# Patient Record
Sex: MALE | Race: WHITE | NOT HISPANIC OR LATINO | URBAN - METROPOLITAN AREA
[De-identification: names, ages, dates, MRNs, and addresses within clinical notes are randomized per-mention and may not be internally consistent; named-entity substitution may affect disease eponyms.]

---

## 2024-09-12 ENCOUNTER — EMERGENCY (EMERGENCY)
Facility: HOSPITAL | Age: 39
LOS: 0 days | Discharge: HOME | End: 2024-09-12
Attending: EMERGENCY MEDICINE
Payer: COMMERCIAL

## 2024-09-12 VITALS
DIASTOLIC BLOOD PRESSURE: 76 MMHG | OXYGEN SATURATION: 96 % | SYSTOLIC BLOOD PRESSURE: 132 MMHG | TEMPERATURE: 98 F | WEIGHT: 179.9 LBS | HEART RATE: 90 BPM | RESPIRATION RATE: 18 BRPM

## 2024-09-12 DIAGNOSIS — R07.89 OTHER CHEST PAIN: ICD-10-CM

## 2024-09-12 PROCEDURE — 93010 ELECTROCARDIOGRAM REPORT: CPT

## 2024-09-12 PROCEDURE — 99284 EMERGENCY DEPT VISIT MOD MDM: CPT

## 2024-09-12 PROCEDURE — 99283 EMERGENCY DEPT VISIT LOW MDM: CPT | Mod: 25

## 2024-09-12 PROCEDURE — 93005 ELECTROCARDIOGRAM TRACING: CPT

## 2024-09-12 NOTE — ED PROVIDER NOTE - NSFOLLOWUPINSTRUCTIONS_ED_ALL_ED_FT
Our Emergency Department Referral Coordinators will be reaching out to you in the next 24-48 hours from 9:00am to 5:00pm with a follow up appointment. Please expect a phone call from the hospital in that time frame. If you do not receive a call or if you have any questions or concerns, you can reach them at   [phone]  ------------------------------------------------  Chest Pain    AMBULATORY CARE:    Chest pain can be caused by a range of conditions, from not serious to life-threatening. It is important to follow up with your healthcare provider to find the cause of your chest pain.    Common symptoms you may have with chest pain:    Fever or sweating    Nausea or vomiting    Shortness of breath    Discomfort or pressure that spreads from your chest to your back, jaw, or arm    A racing or slow heartbeat    Feeling weak, tired, or faint  Call your local emergency number (911 in the [place]) or have someone call if:    You have any of the following signs of a heart attack:  Squeezing, pressure, or pain in your chest    You may also have any of the following:  Discomfort or pain in your back, neck, jaw, stomach, or arm    Shortness of breath    Nausea or vomiting    Lightheadedness or a sudden cold sweat    Seek care immediately if:    You have chest discomfort that gets worse, even with medicine.    You cough or vomit blood.    Your bowel movements are black or bloody.    You cannot stop vomiting, or it hurts to swallow.  Call your doctor if:    You have questions or concerns about your condition or care.    Treatment for chest pain may include medicine to treat your symptoms while your healthcare provider finds the cause of your chest pain.    Medicines may be given to treat the cause of your chest pain. Examples include pain medicine, anxiety medicine, or medicines to increase blood flow to your heart.    Do not take certain medicines without asking your healthcare provider first. These include NSAIDs, herbal or vitamin supplements, and hormones, such as estrogen or progestin.    One or more stents may need to be placed in your heart if pain was caused by blockage. A stent is a wire mesh tube that helps hold your artery open.  Healthy living tips: If the cause of your chest pain is known, your healthcare provider will give you specific guidelines to follow. The following are general healthy guidelines:    Do not smoke. Nicotine and other chemicals in cigarettes and cigars can cause lung and heart damage. Ask your healthcare provider for information if you currently smoke and need help to quit. E-cigarettes or smokeless tobacco still contain nicotine. Talk to your healthcare provider before you use these products.    Choose a variety of healthy foods as often as possible. Include fresh, frozen, or canned fruits and vegetables. Also include low-fat dairy products, fish, chicken (without skin), and lean meats. Your healthcare provider or a dietitian can help you create meal plans. You may need to avoid certain foods or drinks if your pain is caused by a digestion problem.  Healthy Foods      Lower your sodium (salt) intake. Limit foods that are high in sodium, such as canned foods, salty snacks, and cold cuts. If you add salt when you cook food, do not add more at the table. Choose low-sodium canned foods as much as possible.        Drink plenty of water every day. Water helps your body to control your temperature and blood pressure. Ask your healthcare provider how much water you should drink every day.    Ask about activity. Your healthcare provider will tell you which activities to limit or avoid. Ask when you can drive, return to work, and have sex. Ask about the best exercise plan for you.    Maintain a healthy weight. Ask your healthcare provider what a healthy weight is for you. Ask him or her to help you create a safe weight loss plan if you are overweight.    Ask about vaccines you may need. Your healthcare provider can tell you which vaccines you need, and when to get them. The following vaccines help prevent diseases that can become serious for a person with a heart condition:  The influenza (flu) vaccine is given each year. Get a flu vaccine as soon as recommended, usually in September or October.    The pneumonia vaccine is usually given every 5 years. Your healthcare provider may recommend the pneumonia vaccine if you are 65 or older.    COVID-19 vaccines are given to adults as a shot. At least 1 dose of an updated vaccine is recommended for all adults. COVID-19 vaccines are updated throughout the year. Adults 65 or older need a second dose of updated vaccine at least 4 months after the first dose. Your healthcare provider can help you schedule all needed doses as updated vaccines become available.  Prevent Heart Disease   Follow up with your doctor within 72 hours, or as directed: You may need to return for more tests to find the cause of your chest pain. You may be referred to a specialist, such as a cardiologist or gastroenterologist. Write down your questions so you remember to ask them during your visits.

## 2024-09-12 NOTE — ED PROVIDER NOTE - PHYSICAL EXAMINATION
VITAL SIGNS: I have reviewed nursing notes and confirm.  CONSTITUTIONAL: Well-developed; well-nourished; in no acute distress.  SKIN: Skin exam is warm and dry, no acute rash.  EYES: PERRL, conjunctiva and sclera clear.  ENT: mmm  CARD: S1, S2 normal; no murmurs, gallops, or rubs. Regular rate and rhythm.  RESP: Normal respiratory effort, no tachypnea or distress. Lungs CTAB, no wheezes, rales or rhonchi.  chest wall non-tender, no skin changes  ABD: soft, NT/ND.  EXT: Normal ROM. No clubbing, cyanosis or edema.  NEURO: Alert, oriented. Grossly unremarkable. No focal deficits.  PSYCH: Cooperative, appropriate.

## 2024-09-12 NOTE — ED ADULT NURSE NOTE - OBJECTIVE STATEMENT
Pt c/o  chest pain since friday.. Was seen by ER in [place] received bloodwork but no cardiac imaging.

## 2024-09-12 NOTE — ED PROVIDER NOTE - CLINICAL SUMMARY MEDICAL DECISION MAKING FREE TEXT BOX
Independent interpretation of the EKG performed by MD Hodges    DX: CHEST PAIN. Patient's labs/cardiac enzymes this week WNL, and today EKG non-diagnostic and without signs of active ischemia so doubt NSTEMI, HEART score 0, atypical presentation for PE, dissection, pneumothorax (not visualized on chest xr), pericarditis, tamponade, pneumonia (no infectious symptoms, clear chest xr), myocarditis (no recent illness, neg trop). Exam without evidence of volume overload so doubt heart failure.     These elements were d/w the pt. It was explained that initial testing was unremarkable, it does not appear that they are having a heart attack, symptoms are less likely due to cardiac etiology. It was explained that the risk of 30-day major adverse cardiac event upon discharge is low and they do not need admission at this time. Was explained that the source of their symptoms is unclear and that the patient should still follow up with their primary physician or cardiology for further evaluation and management.    The patient was given detailed return precautions and advised to return to the emergency department if any new symptoms developed, symptoms worsened or for any concerns. The patient was offered the opportunity to ask questions and verbalized that they understand the diagnosis and discharge instructions.

## 2024-09-12 NOTE — ED PROVIDER NOTE - OBTAINED AND REVIEWED OLD RECORDS MULTI-SELECT OPTIONS
Prior Outpatient Labs/Prior Outpatient Radiology Inpatient Records/Prior Outpatient Labs/Prior Outpatient Radiology

## 2024-09-12 NOTE — ED PROVIDER NOTE - PATIENT PORTAL LINK FT
You can access the FollowMyHealth Patient Portal offered by [facility] by registering at the following website: [email or web address]. By joining [facility]’s FollowMyHealth portal, you will also be able to view your health information using other applications (apps) compatible with our system.

## 2024-09-12 NOTE — ED ADULT NURSE NOTE - CHIEF COMPLAINT QUOTE
Chest pain since friday, denies pmhx. Was seen by ER in [place] received bloodwork but no cardiac imaging.

## 2024-09-12 NOTE — ED PROVIDER NOTE - OBJECTIVE STATEMENT
39M no PMH presents with CP x4 days. Pt was seen at [facility] initially with negative ekg, cxr and troponins and was discharged home with outpatient f/u. Reports constant R sided chest wall pain without modifying or relieving factors. Denies trauma, fever/chills, SOB, palpitations, abdominal pain, n/v/d, urinary sx, extremity swelling/numbness/weakness/paresthesias, tobacco or substance use. Does not have PMD or cardiologist, no imaging in the past.

## 2024-09-12 NOTE — ED PROVIDER NOTE - ATTENDING CONTRIBUTION TO CARE
I have reviewed and agree with the mid-level note, except as documented in my note below.    39-year-old male denies significant PMH/PSH, non-smoker, denies daily alcohol use, denies FH CAD, presents with right-sided chest discomfort for the past several days, persistent, denies radiation or modifying factors, denies exertional chest pain, fever, nausea, vomiting, diaphoresis, hemoptysis, SOB, orthopnea, PND, LE pain or swelling, recent immobilization or travel or other associated complaints at present. The patient was seen in [facility] and has his results available for review, EKG, cardiac enzymes, labs, x-ray nondiagnostic, was discharged to follow-up as outpatient. No old chart available for review. I have reviewed and agree with the initial nursing note, except as documented in my note.    VSS, awake, alert, non-toxic appearing, oropharynx clear, mmm, no skin rash or lesions, chest CTAB, non-labored breathing, no w/r/r, +S1/S2, RRR, no m/r/g, abdomen soft, NT, ND, +BS, no organomegaly or pulsatile masses, no peripheral edema or deformities, equal pulses upper and lower extremities, alert, clear speech and steady gait.

## 2024-09-12 NOTE — ED ADULT TRIAGE NOTE - CHIEF COMPLAINT QUOTE
Chest pain since friday, denies pmhx. Was seen by ER in [place] received bloodwork but no cardiac imaging.

## 2024-09-12 NOTE — ED ADULT NURSE NOTE - NSFALLUNIVINTERV_ED_ALL_ED
Assistance with ambulation/Communicate risk of Fall with Harm to all staff, patient, and family/Monitor gait and stability/Monitor for mental status changes and reorient to person, place, and time, as needed/Reinforce activity limits and safety measures with patient and family/Use of alarms - bed, stretcher, chair and/or video monitoring/Bed/Stretcher in lowest position, wheels locked, appropriate side rails in place/Call bell, personal items and telephone in reach/Instruct patient to call for assistance before getting out of bed/chair/stretcher/Non-slip footwear applied when patient is off stretcher/[place] to call system/Physically safe environment - no spills, clutter or unnecessary equipment/Purposeful proactive rounding/Room/bathroom lighting operational, light cord in reach

## 2024-09-12 NOTE — ED PROVIDER NOTE - DIFFERENTIAL DIAGNOSIS
Differential Diagnosis see mdm    Independent interpretation of the EKG performed by MD Hodges see mdm

## 2024-09-17 NOTE — CHART NOTE - NSCHARTNOTEFT_GEN_A_CORE
[facility] MRN : [id number] - patient would like pcp follow up mornings please / Emailed [facility] 9/12 - TANA/ [facility] eid two VMs 9/17 - DK

## 2024-09-26 ENCOUNTER — APPOINTMENT (OUTPATIENT)
Dept: CARDIOLOGY | Facility: CLINIC | Age: 39
End: 2024-09-26
Payer: COMMERCIAL

## 2024-09-26 ENCOUNTER — RESULT CHARGE (OUTPATIENT)
Age: 39
End: 2024-09-26

## 2024-09-26 VITALS
HEIGHT: 68 IN | BODY MASS INDEX: 28.04 KG/M2 | SYSTOLIC BLOOD PRESSURE: 124 MMHG | WEIGHT: 185 LBS | HEART RATE: 68 BPM | DIASTOLIC BLOOD PRESSURE: 82 MMHG

## 2024-09-26 DIAGNOSIS — Z13.220 ENCOUNTER FOR SCREENING FOR LIPOID DISORDERS: ICD-10-CM

## 2024-09-26 DIAGNOSIS — Z78.9 OTHER SPECIFIED HEALTH STATUS: ICD-10-CM

## 2024-09-26 DIAGNOSIS — Z13.6 ENCOUNTER FOR SCREENING FOR LIPOID DISORDERS: ICD-10-CM

## 2024-09-26 DIAGNOSIS — R07.89 OTHER CHEST PAIN: ICD-10-CM

## 2024-09-26 PROBLEM — Z00.00 ENCOUNTER FOR PREVENTIVE HEALTH EXAMINATION: Status: ACTIVE | Noted: 2024-09-26

## 2024-09-26 PROCEDURE — 93000 ELECTROCARDIOGRAM COMPLETE: CPT

## 2024-09-26 PROCEDURE — 99203 OFFICE O/P NEW LOW 30 MIN: CPT | Mod: 25

## 2024-09-26 NOTE — REASON FOR VISIT
[FreeTextEntry1] : Mr. SIMBA WARNER is a 39 year old man with no significant PMHx who is presenting for evaluation of substernal chest pain. For the past few weeks, patient has been experiencing pain when he gets anxiety. The pain is not associated with exertion. He works out regularly and has no limitations. He has no SOB or DENG. He went to [facility] ED in [place]- ECG and labs (including troponin) were normal. He was discharged, but he then presented to [facility] ED for similar symptoms. ECG was normal and he was dicsharged.   He is an [employer] .

## 2024-09-26 NOTE — ASSESSMENT
[FreeTextEntry1] : Mr. SIMBA WARNER is a 39 year old man with no significant PMHx who is presenting for evaluation of substernal chest pain.  -His pain is atypical, and likely non-cardiac -Order TTE to rule out structural changes -Order exercise stress ECG test in this low risk individual -Offered to screen for lipid disorders, he declined  Time in encounter, including face to face visit and time reviewing chart: 40  minutes  Jung Viera MD, FACC Non-Invasive Cardiology [address], [address] Office: [phone]

## 2024-09-26 NOTE — ASSESSMENT
[FreeTextEntry1] : Mr. SIMBA WARNER is a 39 year old man with no significant PMHx who is presenting for evaluation of substernal chest pain.  -His pain is atypical, and likely non-cardiac -Order TTE to rule out structural changes -Order exercise stress ECG test in this low risk individual -Offered to screen for lipid disorders, he declined  Time in encounter, including face to face visit and time reviewing chart: 40  minutes  Jung Viera MD, FACC Non-Invasive Cardiology [address], [address] Office: [phone]

## 2024-10-18 ENCOUNTER — APPOINTMENT (OUTPATIENT)
Dept: CARDIOLOGY | Facility: CLINIC | Age: 39
End: 2024-10-18

## 2024-12-05 ENCOUNTER — APPOINTMENT (OUTPATIENT)
Dept: CARDIOLOGY | Facility: CLINIC | Age: 39
End: 2024-12-05

## 2025-01-06 NOTE — REASON FOR VISIT
History and Physical    HISTORY OF PRESENT ILLNESS:   s/p Left Atrial Appendage Closure Device Insertion, Watchman, will be admitted inpatient.      Objective     I/O's    Intake/Output Summary (Last 24 hours) at 1/6/2025 1125  Last data filed at 1/6/2025 1051  Gross per 24 hour   Intake 600 ml   Output 20 ml   Net 580 ml       Last Recorded Vitals  Blood pressure (!) 179/93, pulse 70, temperature 97.7 °F (36.5 °C), resp. rate 16, height 6' (1.829 m), weight 99.9 kg (220 lb 3.8 oz), SpO2 94%.  Body mass index is 29.87 kg/m².    Review of Systems  Constitutional:  Negative except as per HPI.  Skin: Negative except as per HPI.  HEENT: Negative except as per HPI.  Respiratory: Negative except as per HPI.  Cardiovascular: Negative except as per HPI.  Gastrointestinal: Negative except as per HPI.  Genitourinary: Negative except as per HPI.  Extremities:  Negative except as per HPI.  Endocrine: Negative except as per HPI.  Psychiatric: Negative except as per HPI    Physical Exam    General:  Alert, cooperative, conversive. No acute distress.  Skin: No bleeding, no oozing, no swelling, no hematoma noted. With purstring suture and femostop to groin  Head:  Normocephalic-atraumatic.   Cardiovascular:  Rate controlled   Respiratory:   Normal respiratory effort.  Clear to auscultation.  No wheezes, rales or rhonchi.  Gastrointestinal:  Soft and nontender.  Normal bowel sounds.    Gu: Voiding, no issues  Musculoskeletal:  No deformity or edema.  No tenderness to palpation.    Neurologic:   Oriented times 4.   Psychiatric:   Cooperative.  Appropriate mood and affect.  Normal judgment.      Labs   No results found  Lab Results   Component Value Date    SODIUM 136 12/19/2024    POTASSIUM 4.3 12/19/2024    CHLORIDE 109 12/19/2024    CO2 27 12/19/2024    BUN 13 12/19/2024    CREATININE 1.13 12/19/2024    GLUCOSE 108 (H) 12/19/2024    WBC 10.1 12/19/2024    HCT 43.9 12/19/2024    HGB 13.9 12/19/2024      2024    AST 23 2024    GPT 23 2024    ALKPT 113 2024    BILIRUBIN 1.6 (H) 2024      INR (no units)   Date Value   2024 1.1     TSH (mcUnits/mL)   Date Value   2024 2.499        Imaging    === 24 ===    TRANSTHORACIC ECHO(TTE) COMPLETE W/ W/O IMAGING AGENT    - Narrative -  *[facility]*  [address] [place] [id number]  [phone]  Transthoracic Echocardiogram (TTE)    Patient: Yoni Perez    Study Date/Time:        Sep 24 2024 11:36AM  MRN:     [id number]           FIN#:                   [id number]  :     1950        Ht/Wt:                  190.5cm 97.5kg  Age:     74                BSA/BMI:                2.28m^2 26.9kg/m^2  Gender:  M                 Baseline BP:            138 / 85  *Ordering Physician:* Aman Patterson MD    *Attending Physician:* Janice BarfieldPerforming Physician:* [facility], [facility]  *Diagnostic Physician:* Aman Patterson MD  *Sonographer:* DANY Conley    ------------------------------------------------------------------------------  INDICATIONS:   Congestive heart failure.    ------------------------------------------------------------------------------  STUDY CONCLUSIONS  SUMMARY:    1. Left ventricle: The cavity size is normal. Wall thickness is at the upper  limits of normal. Systolic function is normal. The ejection fraction was  measured by visual estimation. The ejection fraction is 60%.  2. Aortic valve: There is mild regurgitation.  3. Ascending aorta: The vessel is dilated at 4.5cm.  4. Mitral valve: There is moderate regurgitation.  5. Right ventricle: The cavity size is normal. Wall thickness is normal.  Systolic function is normal.  IMPRESSIONS:   The study shows worsening since the study of 2023.  Mild pulmonary hypertension.    ------------------------------------------------------------------------------  STUDY DATA:   Procedure:  A  transthoracic echocardiogram was performed. Image  quality was good. The study was technically limited due to body habitus.  Intravenous contrast (Definity 2ml) was administered to opacify the left  ventricle.  M-mode, complete 2D, complete spectral Doppler, color Doppler, and  intravenous contrast injection.  Study status:  Routine.  Study completion:  There were no complications.    FINDINGS    LEFT VENTRICLE:  The cavity size is normal. Wall thickness is at the upper  limits of normal. Systolic function is normal. Wall motion is normal; there  are no regional wall motion abnormalities.    The ejection fraction was  measured by visual estimation. The ejection fraction is 60%. Left ventricular  diastolic function parameters are normal.    AORTIC VALVE:  The annulus is normal. The valve is structurally normal. The  valve is trileaflet. The leaflets are normal thickness. Cusp separation is  normal. Velocity is within the normal range. There is no stenosis. There is  mild regurgitation. The mean systolic gradient is 3mm Hg. The peak systolic  gradient is 7mm Hg. The LVOT to aortic valve VTI ratio is 0.53. The valve area  is 2.2cm^2. The valve area index is 0.96cm^2/m^2. The ratio of LVOT to aortic  valve peak velocity is 0.49. The valve area is 2.0cm^2. The valve area index  is 0.89cm^2/m^2.    AORTA:  Aortic root: The root is normal-sized.  Ascending aorta: The vessel is dilated at 4.5cm.    MITRAL VALVE:  The valve is structurally normal. The annulus is normal. The  leaflets are normal thickness. Leaflet separation is normal. Inflow velocity  is within the normal range. There is no evidence for stenosis. There is  moderate regurgitation. The peak diastolic gradient is 2mm Hg. The valve area  by pressure half-time is 3.7cm^2. The valve area index by pressure half-time  is 1.61cm^2/m^2.    LEFT ATRIUM:  The atrium is normal in size.    RIGHT VENTRICLE:  The cavity size is normal. Wall thickness is normal.  Systolic  function is normal.  Systolic pressure is mildly increased. The  estimated peak pressure is 44mm Hg.       The RV pressure during systole is  44mm Hg.    PULMONIC VALVE:   The valve is structurally normal. Cusp separation is normal.  Velocity is within the normal range. There is no regurgitation.    TRICUSPID VALVE:  The valve is structurally normal. Leaflet separation is  normal. Inflow velocity is within the normal range. There is mild-moderate  regurgitation.    PULMONARY ARTERIES:  The main pulmonary artery is normal-sized. Systolic pressure is within the  normal range.    RIGHT ATRIUM:  The atrium is normal in size.    PERICARDIUM:   There is no pericardial effusion.    SYSTEMIC VEINS:  Inferior vena cava: The IVC is normal-sized.    ------------------------------------------------------------------------------  Measurements    Left ventricle           Value          Ref        10/31/2023  Aortic valve             Value          Ref       10/31/2023  MONIK, LAX chord       (N) 4.8   cm       4.2 - 5.8  5.3         Peak v, S                1.3   m/sec    --------- 1.3  ESD, LAX chord       (N) 3.2   cm       2.5 - 4.0  3.2         Mean v, S                0.83  m/sec    --------- 0.88  MONIK/bsa, LAX chord   (L) 2.1   cm/m^2   2.2 - 3.0  2.4         Mean grad, S             3     mm Hg    --------- 3  ESD/bsa, LAX chord   (N) 1.4   cm/m^2   1.3 - 2.1  1.4         Peak grad, S             7     mm Hg    --------- 7  PW, ED, LAX          (N) 0.9   cm       0.6 - 1.0  0.9         LVOT/AV, VTI ratio       0.53           --------- 0.82  PW thickening, LAX       33    %        ---------- 44          JOBY, VTI                 2.2   cm^2     --------- 3.4  IVS, ED              (H) 1.1   cm       0.6 - 1.0  0.9         JOBY/bsa, VTI             0.96  cm^2/m^2 --------- 1.53  IVS, ES                  1.2   cm       ---------- 1.4         LVOT/AV, Vpeak ratio     0.49           --------- 0.64  IVS thickening           9     %         ---------- 56          JOBY, Vmax                2.0   cm^2     --------- 2.9  PW, ED               (N) 0.9   cm       0.6 - 1.0  0.9         JOBY/bsa, Vmax            0.89  cm^2/m^2 --------- 1.3  PW, ES                   1.2   cm       ---------- 1.3         AR ED v                  4.18  m/s      --------- 4.26  PW thickening            33    %        ---------- 44          AR decel                 122   cm/s^2   --------- 196  IVS/PW, ED               1.22           ---------- 1           AR PHT                   1007  ms       --------- 636  EDV                  (N) 111   ml       62 - 150   149         AR ED grad               70    mm Hg    --------- 73  ESV                  (N) 33    ml       21 - 61    33  EF                   (N) 60    %        52 - 72    65          Mitral valve             Value          Ref       10/31/2023  SV                       67    ml       ---------- 94          Kayce diam                 4.7   cm       --------- ----------  EDV/bsa              (N) 49    ml/m^2   34 - 74    67          Peak E                   0.77  m/sec    --------- 0.76  ESV/bsa              (N) 14    ml/m^2   11 - 31    15          Peak A                   0.31  m/sec    --------- 0.36  SV/bsa                   29    ml/m^2   ---------- 42          Decel time               173   ms       --------- 162  PW, ED MM            (H) 2.3   cm       0.6 - 1.0  ----------  PHT                      60    ms       --------- 62  E', lat kayce, TDI     (L) 8.9   cm/sec   >=10.0     13.6        Peak grad, D             2     mm Hg    --------- 2  E/e', lat kayce TDI   (N) 9              <=13       6           Peak E/A ratio           2.5            --------- 2.1  E', med kayce, TDI     (L) 5.9   cm/sec   >=7.0      9.0         MVA, PHT                 3.7   cm^2     --------- 3.6  E/e', med kayce, TDI       13             ---------- 8           MVA/bsa, PHT             1.61  cm^2/m^2 --------- 1.59  E', avally, TDI              7.395 cm/sec   ---------- 11.315      MR vol, LVOT cont        42    ml       --------- ----------  E/e', avg, TDI       (N) 10             <=14       7           MR peak v                5.76  m/sec    --------- 5.5  Peak LV-LA grad S        133   mm Hg    --------- 121  LVOT                     Value          Ref        10/31/2023  Max MR v                 5.77  m/sec    --------- 5.72  Diam, S                  2.3   cm       ---------- 2.4         Peak LV-LA grad S        133   mm Hg    --------- 131  Area                     4.2   cm^2     ---------- 4.5         Regurg VTI               221.0 cm       --------- ----------  Peak matt, S              0.63  m/sec    ---------- 0.84        ERO, PISA                0.19  cm^2     --------- ----------  Peak grad, S             2     mm Hg    ---------- 3  Pulmonic valve           Value          Ref       10/31/2023  Right ventricle          Value          Ref        10/31/2023  NJ v, ED                 1.09  m/sec    --------- 1.14  MONIK, LAX                 3.7   cm       ---------- 3.2  TAPSE, MM            (N) 1.9   cm       >=1.7      3.4         Tricuspid valve          Value          Ref       10/31/2023  Pressure, S              44    mm Hg    ---------- 31          TR peak v            (H) 3.2   m/sec    <=2.8     2.4  S' lateral           (N) 11.2  cm/sec   6.0 - 13.4 18.9        Peak RV-RA grad, S       41    mm Hg    --------- 24    Left atrium              Value          Ref        10/31/2023  Aortic root              Value          Ref       10/31/2023  AP dim, ES           (H) 4.9   cm       3.0 - 4.0  4.9         Root diam, ED        (N) 3.5   cm       2.9 - 4.3 4.2  AP dim index, ES     (N) 2.1   cm/m^2   1.5 - 2.3  2.2  Area ES, A4C         (H) 33    cm^2     <=20       29          Ascending aorta          Value          Ref       10/31/2023  Area/bsa ES, A4C         14.54 cm^2/m^2 ---------- 13.18       AAo AP diam, ED      (H) 4.2   cm       2.2 - 3.8  4.1  Area ES, A2C             35    cm^2     ---------- 34          AAo AP diam/bsa, ED  (N) 1.8   cm/m^2   1.1 - 1.9 1.8  Area/bsa ES, A2C         15.51 cm^2/m^2 ---------- 15.29  Vol, ES, 1-p A4C     (H) 124   ml       18 - 58    105         Pulmonary artery         Value          Ref       10/31/2023  Vol/bsa, ES, 1-p A4C (H) 54    ml/m^2   12 - 37    47          Pressure, S              44    mm Hg    --------- ----------  Vol, ES, 1-p A2C     (H) 139   ml       18 - 58    135  Vol/bsa, ES, 1-p A2C (H) 61    ml/m^2   11 - 43    61  Vol, ES, 2-p             133   ml       ---------- 124  Vol/bsa, ES, 2-p     (H) 58    ml/m^2   16 - 34    56  Legend:  (L)  and  (H)  amauri values outside specified reference range.    (N)  marks values inside specified reference range.    Prepared and electronically signed by  Aman Patterson MD  2024 15:31      === 10/30/23 ===    TRANSTHORACIC ECHO(TTE) COMPLETE W/ W/O IMAGING AGENT    - Narrative -  *[facility]*  [address] [address] [phone]  Transthoracic Echocardiogram (TTE)    Patient: Yoni Perez     Study Date/Time:         Oct 31 2023 9:49AM  MRN:     [id number]            FIN#:                    [id number]  :     1950         Ht/Wt:                   188cm 94.4kg  Age:     73                 BSA/BMI:                 2.23m^2 26.7kg/m^2  Gender:  M                  Baseline BP:             140 / 81  Ordering Physician:   Jair Hassan MD    Referring Physician:  Jair Hassan MD    Attending Physician:  Janice Barfield  Performing Physician: [facility], Cardiology  Diagnostic Physician: Jair Hassan MD  Sonographer:          DANY Conley    ------------------------------------------------------------------------------  INDICATIONS:   Chest pain.    ------------------------------------------------------------------------------  STUDY CONCLUSIONS  SUMMARY:    1. Left  ventricle: The cavity size is normal. Wall thickness is normal.  Systolic function is normal. The ejection fraction was measured by biplane  method of disks. Doppler parameters are consistent with abnormal left  ventricular relaxation (grade 1 diastolic dysfunction). The ejection  fraction is 65%.  2. Aortic valve: There is trace regurgitation.  3. Left atrium: The atrium is moderately dilated.  4. Right ventricle: The cavity size is normal. Wall thickness is normal.  Systolic function is normal.    ------------------------------------------------------------------------------  STUDY DATA:   Procedure:  A transthoracic echocardiogram was performed. Image  quality was good.  M-mode, complete 2D, complete spectral Doppler, and color  Doppler.  Study status:  Routine.  Study completion:  There were no  complications.    FINDINGS    LEFT VENTRICLE:  The cavity size is normal. Wall thickness is normal. Systolic  function is normal. Wall motion is normal; there are no regional wall motion  abnormalities.    The ejection fraction was measured by biplane method of  disks. The ejection fraction is 65%. Doppler parameters are consistent with  abnormal left ventricular relaxation (grade 1 diastolic dysfunction).    AORTIC VALVE:  The annulus is normal. The valve is structurally normal. The  valve is trileaflet. The leaflets are normal thickness. Cusp separation is  normal. Velocity is within the normal range. There is no stenosis. There is  trace regurgitation. The mean systolic gradient is 3mm Hg. The peak systolic  gradient is 7mm Hg. The LVOT to aortic valve VTI ratio is 0.82. The valve area  is 3.4cm^2. The valve area index is 1.53cm^2/m^2. The ratio of LVOT to aortic  valve peak velocity is 0.64. The valve area is 2.9cm^2. The valve area index  is 1.3cm^2/m^2.    AORTA:  Aortic root: The root is normal-sized.    MITRAL VALVE:  The valve is structurally normal. The annulus is normal. The  leaflets are normal thickness.  Leaflet separation is normal. Inflow velocity  is within the normal range. There is no evidence for stenosis. There is mild  regurgitation. The peak diastolic gradient is 2mm Hg. The valve area by  pressure half-time is 3.6cm^2. The valve area index by pressure half-time is  1.59cm^2/m^2.    LEFT ATRIUM:  The atrium is moderately dilated.    RIGHT VENTRICLE:  The cavity size is normal. Wall thickness is normal.  Systolic function is normal.       The RV pressure during systole is 31mm Hg.    PULMONIC VALVE:   The valve is structurally normal. Cusp separation is normal.  Velocity is within the normal range. There is no regurgitation.    TRICUSPID VALVE:  The valve is structurally normal. Leaflet separation is  normal. Inflow velocity is within the normal range. There is mild  regurgitation.    PULMONARY ARTERIES:  The main pulmonary artery is normal-sized. Systolic pressure is at the upper  limits of normal.    RIGHT ATRIUM:  The atrium is normal in size.    PERICARDIUM:   There is no pericardial effusion.    SYSTEMIC VEINS:  Inferior vena cava: The IVC is normal-sized.    ------------------------------------------------------------------------------  Measurements    Left ventricle           Value           Ref         Aortic valve             Value          Ref  MONIK, LAX chord       (N) 5.3    cm       4.2 - 5.8   Leaflet sep, MM          2.4   cm       ---------  ESD, LAX chord       (N) 3.2    cm       2.5 - 4.0   Peak v, S                1.3   m/sec    ---------  MONIK/bsa, LAX chord   (N) 2.4    cm/m^2   2.2 - 3.0   Mean v, S                0.88  m/sec    ---------  ESD/bsa, LAX chord   (N) 1.4    cm/m^2   1.3 - 2.1   Mean grad, S             3     mm Hg    ---------  PW, ED, LAX          (N) 0.9    cm       0.6 - 1.0   Peak grad, S             7     mm Hg    ---------  PW thickening, LAX       44     %        ----------  LVOT/AV, VTI ratio       0.82           ---------  IVS, ED              (N) 0.9    cm        0.6 - 1.0   JOBY, VTI                 3.4   cm^2     ---------  IVS, ES                  1.4    cm       ----------  JOBY/bsa, VTI             1.53  cm^2/m^2 ---------  IVS thickening           56     %        ----------  LVOT/AV, Vpeak ratio     0.64           ---------  PW, ED               (N) 0.9    cm       0.6 - 1.0   JOBY, Vmax                2.9   cm^2     ---------  PW, ES                   1.3    cm       ----------  JOBY/bsa, Vmax            1.3   cm^2/m^2 ---------  PW thickening            44     %        ----------  AR ED v                  4.26  m/s      ---------  IVS/PW, ED               1               ----------  AR decel                 196   cm/s^2   ---------  EDV                  (N) 149    ml       62 - 150    AR PHT                   636   ms       ---------  ESV                  (N) 33     ml       21 - 61     AR ED grad               73    mm Hg    ---------  EF                   (N) 65     %        52 - 72  SV                       94     ml       ----------  Mitral valve             Value          Ref  EDV/bsa              (N) 67     ml/m^2   34 - 74     Peak E                   0.76  m/sec    ---------  ESV/bsa              (N) 15     ml/m^2   11 - 31     Peak A                   0.36  m/sec    ---------  SV/bsa                   42     ml/m^2   ----------  Decel time               162   ms       ---------  E', lat dennis, TDI     (N) 13.6   cm/sec   >=10.0      PHT                      62    ms       ---------  E/e', lat dennis, TDI   (N) 6               <=13        Peak grad, D             2     mm Hg    ---------  E', med dennis, TDI     (N) 9.0    cm/sec   >=7.0       Peak E/A ratio           2.1            ---------  E/e', med dennis, TDI       8               ----------  MVA, PHT                 3.6   cm^2     ---------  E', avg, TDI             11.315 cm/sec   ----------  MVA/bsa, PHT             1.59  cm^2/m^2 ---------  E/e', avg, TDI       (N) 7               <=14        MR peak v                 5.5   m/sec    ---------  Peak LV-LA grad S        121   mm Hg    ---------  LVOT                     Value           Ref         Max MR v                 5.72  m/sec    ---------  Diam, S                  2.4    cm       ----------  Peak LV-LA grad S        131   mm Hg    ---------  Area                     4.5    cm^2     ----------  Peak matt, S              0.84   m/sec    ----------  Pulmonic valve           Value          Ref  Peak grad, S             3      mm Hg    ----------  AZ v, ED                 1.14  m/sec    ---------  AZ grad, ED              5     mm Hg    ---------  Right ventricle          Value           Ref  MONIK, LAX                 3.2    cm       ----------  Tricuspid valve          Value          Ref  TAPSE, MM            (N) 3.4    cm       >=1.7       TR peak v            (N) 2.4   m/sec    <=2.8  Pressure, S              31     mm Hg    ----------  Peak RV-RA grad, S       24    mm Hg    ---------  S' lateral           (H) 18.9   cm/sec   6.0 - 13.4  Aortic root              Value          Ref  Left atrium              Value           Ref         Root diam, ED        (N) 4.2   cm       2.8 - 4.3  AP dim, ES           (H) 4.9    cm       3.0 - 4.0  AP dim index, ES     (N) 2.2    cm/m^2   1.5 - 2.3   Ascending aorta          Value          Ref  Area ES, A4C         (H) 29     cm^2     <=20        AAo AP diam, ED      (H) 4.1   cm       2.2 - 3.8  Area/bsa ES, A4C         13.18  cm^2/m^2 ----------  AAo AP diam/bsa, ED  (N) 1.8   cm/m^2   1.1 - 1.9  Area ES, A2C             34     cm^2     ----------  Area/bsa ES, A2C         15.29  cm^2/m^2 ----------  Pulmonary artery         Value          Ref  Vol, ES, 1-p A4C     (H) 105    ml       18 - 58     Pressure, S              31    mm Hg    ---------  Vol/bsa, ES, 1-p A4C (H) 47     ml/m^2   12 - 37  Vol, ES, 1-p A2C     (H) 135    ml       18 - 58  Vol/bsa, ES, 1-p A2C (H) 61     ml/m^2   11 - 43  Vol, ES, 2-p             124    ml        ----------  Vol/bsa, ES, 2-p     (H) 56     ml/m^2   16 - 34  Legend:  (L)  and  (H)  amauri values outside specified reference range.    (N)  marks values inside specified reference range.    Prepared and electronically signed by  Jair Hassan MD  10/31/2023 11:44      === 12/15/22 ===    TRANSTHORACIC ECHO(TTE) COMPLETE W/ W/O IMAGING AGENT    LAST X-RAY:  === 01/06/25 ===    XR CHEST AP OR PA    - Narrative -  EXAM: XR CHEST AP OR PA    CLINICAL INFORMATION: Pulmonary Fibrosis, COPD.    COMPARISON: 12/1/2024    FINDINGS: Portable AP upright chest image obtained. There is elevation of  the right hemidiaphragm which limits assessment of the right base.  Follow-up lateral chest x-ray can be obtained. Small patchy opacities left  base may be atelectasis related. Cardiac silhouette is mildly prominent. No  prominent vascular congestion noted. Bilateral shoulder prostheses  demonstrated.    - Impression -  1. Elevated appearance of the right hemidiaphragm which limits assessment  of the right base. Lateral chest x-ray can be obtained.      Electronically Signed by: BARBARA GARCIA M.D.  Signed on: 1/6/2025 8:30 AM  Workstation ID: [id number]      === 12/01/24 ===    XR CHEST AP OR PA    - Narrative -  EXAM: XR CHEST AP OR PA    CLINICAL INDICATION: Asthma, bronchitis, pulmonary fibrosis, worsening  productive cough gagging and coughing with medication.    COMPARISON: Chest x-ray 11/21/2024    - Impression -  FINDINGS AND IMPRESSION:    Cardiomediastinal silhouette is normal in size and contour.    Low lung volume, similar to prior. Right diaphragm is obscured, worse  compared to prior study which may be secondary to atelectasis and/or  consolidation in the right lower lobe.    No large pleural effusion. No detectable pneumothorax. Partially visualized  humeral head prostheses.      Electronically Signed by: PILAR SIGALA M.D.  Signed on: 12/1/2024 1:32 PM  Workstation ID: [id number]      === 11/21/24  ===    XR CHEST AP OR PA    - Narrative -  EXAM: XR CHEST AP OR PA    CLINICAL INDICATION: Syncope    COMPARISON: 10/23/2024    Findings and impression:    Bilateral shoulder prostheses. Stable appearance of the cardiac silhouette.  Aortic arch calcifications. Stable to increasing bibasilar opacities, right  worse than left suspicious for atelectasis or developing multifocal  pneumonitis. No large pleural effusion. No detectable pneumothorax.              Electronically Signed by: SYD ORTA M.D.  Signed on: 11/21/2024 9:57 PM  Workstation ID: [id number]    Assessment & Plan   No problem-specific Assessment & Plan notes found for this encounter.        [facility] Stroke Risk Points     Patient's CHADSVASC Score Total = 4    Patient's CHADSVASC Score Summary    Element Patient Score   Congestive Heart Failure 0   High blood pressure 1   Age 1    Diabetes 0   Stroke/TIA/Clot 0   Vascular disease 1   Sex 0       Elements Composing the CHADSVASC Score    Score Element Points   Congestive Heart Failure 1   High blood pressure 1   Age (above 75) 2   Diabetes 1   Stroke/TIA/Clot 2   Vascular disease 1   Age (65-74) 1   Sex (female) 1   Max Score 9      Medications  Medications Prior to Admission   Medication Sig Dispense Refill    [START ON 1/9/2025] HYDROcodone-acetaminophen (NORCO) 5-325 MG per tablet Take 1 tablet by mouth 2 times daily as needed for Pain. Begin taking on January 9, 2025. (Patient taking differently: Take 1 tablet by mouth in the morning and 1 tablet in the evening. Begin taking on January 9, 2025.) 60 tablet 0    oxycodone-acetaminophen (PERCOCET) 7.5-325 MG per tablet Take 1 tablet by mouth 2 times daily as needed for Pain. Begin taking on December 21, 2024. 30 tablet 0    tiZANidine (ZANAFLEX) 2 MG tablet Take 2 tablets by mouth 3 times daily as needed. Watch for drowsiness. Do not take with other muscle relaxants. (Patient taking differently: Take 4 mg by mouth in the morning and 4 mg at noon  and 4 mg in the evening. Take 2 tablets by mouth 3 times daily as needed. Watch for drowsiness. Do not take with other muscle relaxants.) 120 tablet 1    [START ON 2025] oxycodone-acetaminophen (PERCOCET) 7.5-325 MG per tablet Take 1 tablet by mouth 2 times daily as needed for Pain. Begin taking on 2025. (Patient taking differently: Take 1 tablet by mouth nightly. Begin taking on 2025.) 30 tablet 0    [START ON 2025] HYDROcodone-acetaminophen (NORCO) 5-325 MG per tablet Take 1 tablet by mouth 2 times daily as needed for Pain. Begin taking on 2025. 60 tablet 0    pregabalin (LYRICA) 75 MG capsule Take 1 capsule by mouth in the morning and 1 capsule at noon and 1 capsule in the evening. 90 capsule 1    [] zinc sulfate (ZINCATE) 220 (50 Zn) MG capsule Take 1 capsule by mouth daily. 30 capsule 0    pantoprazole (PROTONIX) 40 MG tablet Take 1 tablet by mouth daily (before breakfast). 30 tablet 0    acetaminophen (TYLENOL) 325 MG tablet Take 2 tablets by mouth every 6 hours as needed for Pain.      betamethasone dipropionate (DIPROSONE) 0.05 % cream Apply topically 2 times daily. 45 g 0    docusate sodium (COLACE) 100 MG capsule Take 1 capsule by mouth daily. 30 capsule 0    pregabalin (LYRICA) 75 MG capsule Take 75 mg by mouth in the morning and 75 mg at noon and 75 mg in the evening.      apixaBAN (Eliquis) 5 MG Tab Take 5 mg by mouth every 12 hours.      sennosides-docusate sodium (SENOKOT-S) 8.6-50 MG tablet Take 1 tablet by mouth daily as needed for Constipation.      metoPROLOL succinate (TOPROL-XL) 50 MG 24 hr tablet Take 1 tablet by mouth in the morning and 1 tablet in the evening. 180 tablet 3    traZODone (DESYREL) 100 MG tablet Take 1 tablet by mouth nightly. (Patient taking differently: Take 100 mg by mouth in the morning and 100 mg at noon and 100 mg in the evening.) 90 tablet 3    clobetasol (TEMOVATE) 0.05 % cream Apply 1 Application topically in the  morning and 1 Application in the evening. Behind Left lower leg      naLOXone (NARCAN) 4 MG/0.1ML nasal liquid Call 911. Sullivan the content of 1 device into 1 nostril. May repeat with 2nd device in alternate nostril if no response in 2-3 minutes. 2 each 1    tiotropium (SPIRIVA HANDIHALER) 18 MCG capsule for inhaler Place 1 capsule into inhaler and inhale daily. 30 capsule 11    finasteride (PROSCAR) 5 MG tablet Take 5 mg by mouth every afternoon.      tamsulosin (FLOMAX) 0.4 MG Cap Take 0.8 mg by mouth every afternoon. 180 capsule 1    lidocaine (LIDOCARE) 4 % patch Place 1 patch onto the skin daily. Do not start before December 14, 2023. (Patient taking differently: Place 1 patch onto the skin as needed.) 30 patch 0    Ascorbic Acid (vitamin C) 500 MG tablet Take 500 mg by mouth daily.      ipratropium-albuterol (DUONEB) 0.5-2.5 (3) MG/3ML nebulizer solution Take 3 mLs by nebulization every 4 hours as needed for Wheezing or Shortness of Breath. Dx J45.909 (Patient taking differently: Take 3 mLs by nebulization every 4 hours as needed for Wheezing or Shortness of Breath. Dx J45.909 PRN) 360 mL 11    budesonide-formoterol (SYMBICORT) 160-4.5 MCG/ACT inhaler Inhale 2 puffs into the lungs in the morning and 2 puffs in the evening. 10.2 g 11    albuterol 108 (90 Base) MCG/ACT inhaler Inhale 2 puffs into the lungs every 4 hours as needed for Shortness of Breath or Wheezing. 1 each 11    Cholecalciferol (vitamin D3) 25 mcg (1,000 units) capsule Take 25 mcg by mouth daily.         Current Facility-Administered Medications   Medication    sodium chloride 0.9 % injection 10 mL    sodium chloride 0.9 % injection 2 mL    sodium chloride 0.9% infusion    fentaNYL (SUBLIMAZE) injection 25 mcg    HYDROmorphone (DILAUDID) injection 0.2 mg    HYDROmorphone (DILAUDID) injection 0.5 mg    dextrose (GLUTOSE) 40 % gel 30 g    dextrose 50 % injection 25 g    insulin lispro (ADMELOG, HumaLOG) sliding scale injection 0-8 Units     lidocaine-EPINEPHrine 1 %-1:[id number] injection    iohexol (OMNIPAQUE 350 INJECT) contrast solution       Diagnosis/Assessment and Plan    S/P Left Atrial Appendage Closure Device Insertion, Watchman  Tolerated procedure well.   VSS.  Doing well.   Will continue AC as PTA dosing    afib- Reported GI Bleed on anti coagulation.  Intolerable to long term anti coagulation. Patient is currently taking Eliquis prior to procedure Pre CT done H/O PVI ablation and septal alcohol ablation     COPD- no oxygen managed by PCP     Chronic Pain- patient on multiple pain meds.  Sees pain doctor for management     HTN- cont current meds Managed by cardiology     High falls risk     Per patient No history of issues with anesthesia.         Discharge Plan:    PCP: Dr Angela  Cardiologist: Dr Anthoyn Tyler APRN    Title: [facility] registry  Note: Patient to be entered into the [facility] registry.  [facility] registry Z00.6 CT [id number]   [FreeTextEntry1] : Mr. SIMBA WARNER is a 39 year old man with no significant PMHx who is presenting for evaluation of substernal chest pain. For the past few weeks, patient has been experiencing pain when he gets anxiety. The pain is not associated with exertion. He works out regularly and has no limitations. He has no SOB or DENG. He went to [facility] ED in [place]- ECG and labs (including troponin) were normal. He was discharged, but he then presented to [facility] ED for similar symptoms. ECG was normal and he was dicsharged.   He is an [employer] .